# Patient Record
Sex: MALE | Race: BLACK OR AFRICAN AMERICAN | NOT HISPANIC OR LATINO | Employment: FULL TIME | URBAN - METROPOLITAN AREA
[De-identification: names, ages, dates, MRNs, and addresses within clinical notes are randomized per-mention and may not be internally consistent; named-entity substitution may affect disease eponyms.]

---

## 2024-08-18 ENCOUNTER — HOSPITAL ENCOUNTER (EMERGENCY)
Facility: HOSPITAL | Age: 29
Discharge: HOME | End: 2024-08-18
Payer: MEDICAID

## 2024-08-18 VITALS
RESPIRATION RATE: 18 BRPM | HEIGHT: 76 IN | DIASTOLIC BLOOD PRESSURE: 79 MMHG | SYSTOLIC BLOOD PRESSURE: 117 MMHG | OXYGEN SATURATION: 98 % | HEART RATE: 112 BPM | BODY MASS INDEX: 20.7 KG/M2 | TEMPERATURE: 98.1 F | WEIGHT: 170 LBS

## 2024-08-18 DIAGNOSIS — Z71.1 CONCERN ABOUT STD IN MALE WITHOUT DIAGNOSIS: Primary | ICD-10-CM

## 2024-08-18 LAB
APPEARANCE UR: ABNORMAL
BILIRUB UR STRIP.AUTO-MCNC: NEGATIVE MG/DL
CAOX CRY #/AREA UR COMP ASSIST: ABNORMAL /HPF
COLOR UR: ABNORMAL
GLUCOSE UR STRIP.AUTO-MCNC: NORMAL MG/DL
KETONES UR STRIP.AUTO-MCNC: ABNORMAL MG/DL
LEUKOCYTE ESTERASE UR QL STRIP.AUTO: ABNORMAL
MUCOUS THREADS #/AREA URNS AUTO: ABNORMAL /LPF
NITRITE UR QL STRIP.AUTO: NEGATIVE
PH UR STRIP.AUTO: 5.5 [PH]
PROT UR STRIP.AUTO-MCNC: ABNORMAL MG/DL
RBC # UR STRIP.AUTO: ABNORMAL /UL
RBC #/AREA URNS AUTO: >20 /HPF
SP GR UR STRIP.AUTO: 1.03
UROBILINOGEN UR STRIP.AUTO-MCNC: ABNORMAL MG/DL
WBC #/AREA URNS AUTO: >50 /HPF
WBC CLUMPS #/AREA URNS AUTO: ABNORMAL /HPF
YEAST BUDDING #/AREA UR COMP ASSIST: PRESENT /HPF

## 2024-08-18 PROCEDURE — 2500000004 HC RX 250 GENERAL PHARMACY W/ HCPCS (ALT 636 FOR OP/ED): Performed by: PHYSICIAN ASSISTANT

## 2024-08-18 PROCEDURE — 81001 URINALYSIS AUTO W/SCOPE: CPT | Performed by: PHYSICIAN ASSISTANT

## 2024-08-18 PROCEDURE — 87661 TRICHOMONAS VAGINALIS AMPLIF: CPT | Performed by: PHYSICIAN ASSISTANT

## 2024-08-18 PROCEDURE — 2500000001 HC RX 250 WO HCPCS SELF ADMINISTERED DRUGS (ALT 637 FOR MEDICARE OP): Performed by: PHYSICIAN ASSISTANT

## 2024-08-18 PROCEDURE — 99283 EMERGENCY DEPT VISIT LOW MDM: CPT | Performed by: PHYSICIAN ASSISTANT

## 2024-08-18 PROCEDURE — 96372 THER/PROPH/DIAG INJ SC/IM: CPT | Performed by: PHYSICIAN ASSISTANT

## 2024-08-18 PROCEDURE — 87491 CHLMYD TRACH DNA AMP PROBE: CPT | Performed by: PHYSICIAN ASSISTANT

## 2024-08-18 PROCEDURE — 99284 EMERGENCY DEPT VISIT MOD MDM: CPT | Performed by: PHYSICIAN ASSISTANT

## 2024-08-18 RX ORDER — CEFTRIAXONE 500 MG/1
500 INJECTION, POWDER, FOR SOLUTION INTRAMUSCULAR; INTRAVENOUS ONCE
Status: COMPLETED | OUTPATIENT
Start: 2024-08-18 | End: 2024-08-18

## 2024-08-18 RX ORDER — DOXYCYCLINE 100 MG/1
100 TABLET ORAL 2 TIMES DAILY
Qty: 14 TABLET | Refills: 0 | Status: SHIPPED | OUTPATIENT
Start: 2024-08-18 | End: 2024-08-25

## 2024-08-18 RX ORDER — METRONIDAZOLE 500 MG/1
2000 TABLET ORAL ONCE
Status: COMPLETED | OUTPATIENT
Start: 2024-08-18 | End: 2024-08-18

## 2024-08-18 RX ORDER — DOXYCYCLINE HYCLATE 100 MG
100 TABLET ORAL ONCE
Status: COMPLETED | OUTPATIENT
Start: 2024-08-18 | End: 2024-08-18

## 2024-08-18 ASSESSMENT — COLUMBIA-SUICIDE SEVERITY RATING SCALE - C-SSRS
1. IN THE PAST MONTH, HAVE YOU WISHED YOU WERE DEAD OR WISHED YOU COULD GO TO SLEEP AND NOT WAKE UP?: NO
6. HAVE YOU EVER DONE ANYTHING, STARTED TO DO ANYTHING, OR PREPARED TO DO ANYTHING TO END YOUR LIFE?: NO
2. HAVE YOU ACTUALLY HAD ANY THOUGHTS OF KILLING YOURSELF?: NO

## 2024-08-19 LAB
C TRACH RRNA SPEC QL NAA+PROBE: POSITIVE
N GONORRHOEA DNA SPEC QL PROBE+SIG AMP: POSITIVE
T VAGINALIS RRNA SPEC QL NAA+PROBE: NEGATIVE

## 2024-08-19 NOTE — ED PROVIDER NOTES
HPI   Chief Complaint   Patient presents with    Exposure to STD       HPI: Patient is a 29-year-old male who presents to the ED for concern of STDs.  States that he woke up today with penile discharge and burning.  States that he does have a new sexual partner.  He denies any abdominal pain, fevers, chills, flank pain or testicular pain.  ------------------------------------------------------------------------------------------------------------------------------------------  ROS: a ten point review of systems was performed and was negative except as per HPI.  ------------------------------------------------------------------------------------------------------------------------------------------  PMH / PSH: as per HPI, otherwise reviewed   MEDS: as per HPI, otherwise reviewed in EMR  ALLERGIES: as per HPI, otherwise reviewed in EMR  SocH:  as per HPI, otherwise reviewed in EMR  FH:  as per HPI, otherwise reviewed in EMR   ------------------------------------------------------------------------------------------------------------------------------------------  Physical Exam:  VS: As documented in the triage note and EMR flowsheet from this visit was reviewed  General: Well appearing. No acute distress.   Eyes:  Extraocular movements grossly intact. No scleral icterus.   Head: Atraumatic. Normocephalic.     Neck: No meningismus. No gross masses. Full movement through range of motion  CV: Regular rhythm. No murmurs, rubs, gallops appreciated.   Resp: Clear to auscultation bilaterally. No respiratory distress.    GI: Nontender. Soft. No masses. No rebound, rigidity or guarding.   MSK: Symmetric muscle bulk. No gross step offs or deformities.  Skin: Warm, dry. No rashes  Neuro: CN II-VII intact. A&O x3. Speech fluent. Alert. Moving all extremities. Ambulates with normal gait  Psych: Appropriate mood and affect for  situation  ------------------------------------------------------------------------------------------------------------------------------------------  Hospital Course / Medical Decision Making: Patient is a 29-year-old male who presents to the ED for concern of STDs.  Endorsing penile discharge and burning in the setting of new sexual partner.  On examination, patient well-appearing.  He denies any systemic symptoms.  Patient was prophylactically treated with IM Rocephin, doxycycline and metronidazole.  Prescription for doxycycline provided.  He declines blood work for HIV, hepatitis and syphilis and states that he will come back another day to have this done.As a result of the work-up, the patient was discharged home in stable condition.  They were informed of the diagnosis and instructed to come back with any concerns or worsening of condition.  Agreeable to the plan as discussed above.  Patient given the opportunity to ask questions.  All of the patient's questions were answered.               Patient History   History reviewed. No pertinent past medical history.  History reviewed. No pertinent surgical history.  No family history on file.  Social History     Tobacco Use    Smoking status: Unknown    Smokeless tobacco: Not on file   Substance Use Topics    Alcohol use: Not on file    Drug use: Not on file       Physical Exam   ED Triage Vitals [08/18/24 2044]   Temperature Heart Rate Respirations BP   36.7 °C (98.1 °F) (!) 112 18 117/79      Pulse Ox Temp Source Heart Rate Source Patient Position   98 % Temporal Monitor Sitting      BP Location FiO2 (%)     -- --       Physical Exam      ED Course & MDM   Diagnoses as of 08/18/24 2057   Concern about STD in male without diagnosis                 No data recorded     [place] Coma Scale Score: 15 (08/18/24 2045 : Noel Vasquez RN)                           Medical Decision Making      Procedure  Procedures     Regine Cummins PA-C  08/18/24 2100

## 2024-08-19 NOTE — ED TRIAGE NOTES
PT presents to ED via triage for chief complaint of STD check. PT states he woke up today having penile discharge and pain. PT denies any medical history. PT is Aox4 and ambulates on his own.

## 2024-08-21 ENCOUNTER — TELEPHONE (OUTPATIENT)
Dept: PHARMACY | Facility: HOSPITAL | Age: 29
End: 2024-08-21
Payer: MEDICAID

## 2024-08-21 NOTE — PROGRESS NOTES
EDPD Note: Antibiotics Reviewed and Warranted    Contacted Mr././Ms. Clemente Michael regarding a positive chlamydia culture/result that was taken during their recent emergency room visit. I completed education with patient . The patient is being treated appropriately with doxycycline 100 mg 1 tablet by mouth twice daily for 7 days. Spoke with patient who stated that they are feeling good. Patient was encouraged to abstain from sexual intercourse for at least 7-14 days or after completion of treatment. Patient reported he already informed his sex partner. Patient made aware that if they experience any signs/symptoms to go to ED for further evaluation. Patient verbalized understanding and had no further questions or concerns.       Reviewed Mr././Ms. Clemente Michael 's chart regarding a positive gonorrhea culture/result that was taken during their recent emergency room visit. The patient was not told about these results prior to leaving the emergency department. Therefore, patient was contacted and given proper education.      Patient presented to the ED with penile discharge and burning. Patient was given ceftriaxone while in the ED. Spoke with patient who stated that they are feeling good. Patient was encouraged to abstain from sexual intercourse for at least 7-14 days or after completion of treatment. Patient reported that he already informed his sex partner. Patient made aware that if they experience any signs/symptoms to go to ED for further evaluation.  Patient verbalized understanding and had no further questions or concerns.           No further follow up needed from EDPD Team.     DERRICK DEVINE

## 2024-12-30 ENCOUNTER — APPOINTMENT (OUTPATIENT)
Dept: RADIOLOGY | Facility: HOSPITAL | Age: 29
End: 2024-12-30
Payer: MEDICAID

## 2024-12-30 ENCOUNTER — HOSPITAL ENCOUNTER (EMERGENCY)
Facility: HOSPITAL | Age: 29
Discharge: HOME | End: 2024-12-30
Payer: MEDICAID

## 2024-12-30 VITALS
SYSTOLIC BLOOD PRESSURE: 134 MMHG | OXYGEN SATURATION: 98 % | WEIGHT: 170 LBS | DIASTOLIC BLOOD PRESSURE: 86 MMHG | BODY MASS INDEX: 21.82 KG/M2 | TEMPERATURE: 97 F | RESPIRATION RATE: 16 BRPM | HEART RATE: 71 BPM | HEIGHT: 74 IN

## 2024-12-30 DIAGNOSIS — M25.531 RIGHT WRIST PAIN: Primary | ICD-10-CM

## 2024-12-30 LAB
ALBUMIN SERPL BCP-MCNC: 3.9 G/DL (ref 3.4–5)
ALP SERPL-CCNC: 98 U/L (ref 33–120)
ALT SERPL W P-5'-P-CCNC: 22 U/L (ref 10–52)
ANION GAP SERPL CALC-SCNC: 12 MMOL/L (ref 10–20)
AST SERPL W P-5'-P-CCNC: 26 U/L (ref 9–39)
BASOPHILS # BLD AUTO: 0.02 X10*3/UL (ref 0–0.1)
BASOPHILS NFR BLD AUTO: 0.3 %
BILIRUB SERPL-MCNC: 0.5 MG/DL (ref 0–1.2)
BUN SERPL-MCNC: 9 MG/DL (ref 6–23)
CALCIUM SERPL-MCNC: 9.2 MG/DL (ref 8.6–10.6)
CHLORIDE SERPL-SCNC: 102 MMOL/L (ref 98–107)
CO2 SERPL-SCNC: 28 MMOL/L (ref 21–32)
CREAT SERPL-MCNC: 0.72 MG/DL (ref 0.5–1.3)
CRP SERPL-MCNC: 1.03 MG/DL
EGFRCR SERPLBLD CKD-EPI 2021: >90 ML/MIN/1.73M*2
EOSINOPHIL # BLD AUTO: 0.05 X10*3/UL (ref 0–0.7)
EOSINOPHIL NFR BLD AUTO: 0.7 %
ERYTHROCYTE [DISTWIDTH] IN BLOOD BY AUTOMATED COUNT: 12.7 % (ref 11.5–14.5)
ERYTHROCYTE [SEDIMENTATION RATE] IN BLOOD BY WESTERGREN METHOD: 7 MM/H (ref 0–15)
GLUCOSE SERPL-MCNC: 90 MG/DL (ref 74–99)
HCT VFR BLD AUTO: 41.2 % (ref 41–52)
HGB BLD-MCNC: 15 G/DL (ref 13.5–17.5)
IMM GRANULOCYTES # BLD AUTO: 0.02 X10*3/UL (ref 0–0.7)
IMM GRANULOCYTES NFR BLD AUTO: 0.3 % (ref 0–0.9)
LYMPHOCYTES # BLD AUTO: 2.54 X10*3/UL (ref 1.2–4.8)
LYMPHOCYTES NFR BLD AUTO: 35.8 %
MCH RBC QN AUTO: 30.7 PG (ref 26–34)
MCHC RBC AUTO-ENTMCNC: 36.4 G/DL (ref 32–36)
MCV RBC AUTO: 84 FL (ref 80–100)
MONOCYTES # BLD AUTO: 0.84 X10*3/UL (ref 0.1–1)
MONOCYTES NFR BLD AUTO: 11.8 %
NEUTROPHILS # BLD AUTO: 3.63 X10*3/UL (ref 1.2–7.7)
NEUTROPHILS NFR BLD AUTO: 51.1 %
NRBC BLD-RTO: 0 /100 WBCS (ref 0–0)
PLATELET # BLD AUTO: 162 X10*3/UL (ref 150–450)
POTASSIUM SERPL-SCNC: 3.7 MMOL/L (ref 3.5–5.3)
PROT SERPL-MCNC: 7.3 G/DL (ref 6.4–8.2)
RBC # BLD AUTO: 4.88 X10*6/UL (ref 4.5–5.9)
SODIUM SERPL-SCNC: 138 MMOL/L (ref 136–145)
WBC # BLD AUTO: 7.1 X10*3/UL (ref 4.4–11.3)

## 2024-12-30 PROCEDURE — 73130 X-RAY EXAM OF HAND: CPT | Mod: RT

## 2024-12-30 PROCEDURE — 36415 COLL VENOUS BLD VENIPUNCTURE: CPT

## 2024-12-30 PROCEDURE — 2500000004 HC RX 250 GENERAL PHARMACY W/ HCPCS (ALT 636 FOR OP/ED): Mod: SE

## 2024-12-30 PROCEDURE — 99284 EMERGENCY DEPT VISIT MOD MDM: CPT

## 2024-12-30 PROCEDURE — 73110 X-RAY EXAM OF WRIST: CPT | Mod: RIGHT SIDE | Performed by: STUDENT IN AN ORGANIZED HEALTH CARE EDUCATION/TRAINING PROGRAM

## 2024-12-30 PROCEDURE — 85025 COMPLETE CBC W/AUTO DIFF WBC: CPT

## 2024-12-30 PROCEDURE — 86140 C-REACTIVE PROTEIN: CPT

## 2024-12-30 PROCEDURE — 96372 THER/PROPH/DIAG INJ SC/IM: CPT

## 2024-12-30 PROCEDURE — 73110 X-RAY EXAM OF WRIST: CPT | Mod: RT

## 2024-12-30 PROCEDURE — 73130 X-RAY EXAM OF HAND: CPT | Mod: RIGHT SIDE | Performed by: STUDENT IN AN ORGANIZED HEALTH CARE EDUCATION/TRAINING PROGRAM

## 2024-12-30 PROCEDURE — 80053 COMPREHEN METABOLIC PANEL: CPT

## 2024-12-30 PROCEDURE — 73090 X-RAY EXAM OF FOREARM: CPT | Mod: RIGHT SIDE | Performed by: STUDENT IN AN ORGANIZED HEALTH CARE EDUCATION/TRAINING PROGRAM

## 2024-12-30 PROCEDURE — 73090 X-RAY EXAM OF FOREARM: CPT | Mod: RT

## 2024-12-30 PROCEDURE — 85652 RBC SED RATE AUTOMATED: CPT

## 2024-12-30 RX ORDER — IBUPROFEN 600 MG/1
600 TABLET ORAL EVERY 6 HOURS PRN
Qty: 30 TABLET | Refills: 0 | Status: SHIPPED | OUTPATIENT
Start: 2024-12-30 | End: 2025-01-09

## 2024-12-30 RX ORDER — KETOROLAC TROMETHAMINE 30 MG/ML
30 INJECTION, SOLUTION INTRAMUSCULAR; INTRAVENOUS ONCE
Status: COMPLETED | OUTPATIENT
Start: 2024-12-30 | End: 2024-12-30

## 2024-12-30 RX ADMIN — KETOROLAC TROMETHAMINE 30 MG: 30 INJECTION, SOLUTION INTRAMUSCULAR; INTRAVENOUS at 16:25

## 2024-12-30 ASSESSMENT — PAIN DESCRIPTION - ORIENTATION: ORIENTATION: RIGHT

## 2024-12-30 ASSESSMENT — PAIN SCALES - GENERAL: PAINLEVEL_OUTOF10: 10 - WORST POSSIBLE PAIN

## 2024-12-30 ASSESSMENT — PAIN - FUNCTIONAL ASSESSMENT: PAIN_FUNCTIONAL_ASSESSMENT: 0-10

## 2024-12-30 ASSESSMENT — PAIN DESCRIPTION - LOCATION: LOCATION: WRIST

## 2024-12-30 NOTE — ED TRIAGE NOTES
Patient states that he woke up having R wrist pain, states that his R hand is swollen. States that he believes that he slept on it wrong. No past medical history

## 2024-12-30 NOTE — ED PROVIDER NOTES
HPI   Chief Complaint   Patient presents with    Wrist Pain       Patient is an otherwise healthy 29-year-old male presenting to the ED with wrist pain.  Patient states he woke up with severe right wrist pain today.  He states he believes he slept on it funny.  He denies inciting falls, injury, or trauma.  He does endorse a history of right wrist surgery when he was a young child.  Nothing more recent than this.  Patient is right-hand dominant.  He denies take anything for symptomatic relief prior to presenting today.  He further denies any fevers, chills, flulike symptoms, or bodyaches/myalgias.              Patient History   No past medical history on file.  No past surgical history on file.  No family history on file.  Social History     Tobacco Use    Smoking status: Unknown    Smokeless tobacco: Not on file   Substance Use Topics    Alcohol use: Not on file    Drug use: Not on file       Physical Exam   ED Triage Vitals   Temperature Heart Rate Respirations BP   12/30/24 1528 12/30/24 1529 12/30/24 1528 12/30/24 1529   36.1 °C (97 °F) 71 16 134/86      Pulse Ox Temp src Heart Rate Source Patient Position   12/30/24 1528 -- -- 12/30/24 1528   98 %   Sitting      BP Location FiO2 (%)     12/30/24 1528 --     Right arm        Physical Exam  Vitals reviewed.   Constitutional:       General: He is not in acute distress.     Appearance: He is not ill-appearing.   Cardiovascular:      Rate and Rhythm: Normal rate.   Pulmonary:      Effort: Pulmonary effort is normal. No respiratory distress.      Breath sounds: Normal breath sounds.   Musculoskeletal:      Comments: Significant TTP throughout the right wrist and slightly into the hand.  No snuffbox tenderness.  Able to wiggle all phalanges, although unable to move more than this or make a fist secondary to pain.  No obvious deformity, erythema, or edema.  Sensation and vascular status intact.   Skin:     General: Skin is warm and dry.   Neurological:      General: No  focal deficit present.      Mental Status: He is alert.           ED Course & MDM   Diagnoses as of 12/30/24 1757   Right wrist pain     Labs Reviewed   CBC WITH AUTO DIFFERENTIAL - Abnormal       Result Value    WBC 7.1      nRBC 0.0      RBC 4.88      Hemoglobin 15.0      Hematocrit 41.2      MCV 84      MCH 30.7      MCHC 36.4 (*)     RDW 12.7      Platelets 162      Neutrophils % 51.1      Immature Granulocytes %, Automated 0.3      Lymphocytes % 35.8      Monocytes % 11.8      Eosinophils % 0.7      Basophils % 0.3      Neutrophils Absolute 3.63      Immature Granulocytes Absolute, Automated 0.02      Lymphocytes Absolute 2.54      Monocytes Absolute 0.84      Eosinophils Absolute 0.05      Basophils Absolute 0.02     C-REACTIVE PROTEIN - Abnormal    C-Reactive Protein 1.03 (*)    COMPREHENSIVE METABOLIC PANEL - Normal    Glucose 90      Sodium 138      Potassium 3.7      Chloride 102      Bicarbonate 28      Anion Gap 12      Urea Nitrogen 9      Creatinine 0.72      eGFR >90      Calcium 9.2      Albumin 3.9      Alkaline Phosphatase 98      Total Protein 7.3      AST 26      Bilirubin, Total 0.5      ALT 22     SEDIMENTATION RATE, AUTOMATED - Normal    Sedimentation Rate 7       XR hand right 3+ views   Final Result   No acute fracture or dislocation.             MACRO:   None        Signed by: Rodney Prince 12/30/2024 4:49 PM   Dictation workstation:   [id number]      XR wrist right 3+ views   Final Result   No acute fracture or dislocation.             MACRO:   None        Signed by: Rodney Prince 12/30/2024 4:49 PM   Dictation workstation:   [id number]      XR forearm right 2 views   Final Result   No acute fracture or dislocation.             MACRO:   None        Signed by: Rodney Prince 12/30/2024 4:49 PM   Dictation workstation:   [id number]                  No data recorded     [place] Coma Scale Score: 15 (12/30/24 1528 : Mirna Tang RN)                           Medical Decision  Making  Patient is an otherwise healthy 29-year-old male presenting to the ED with wrist pain.  History was obtained from the patient.  Woke up with severe right wrist pain today.  No inciting falls, injury, or trauma.  Has a history of right wrist surgery and he was young child.  He is right-hand dominant.  Denies other associated symptoms, as noted in HPI.  On physical exam, patient is sitting comfortably and in no apparent distress.  He admits to significant TTP throughout the right wrist and slightly into the hand.  No snuffbox tenderness.  Able to wiggle all phalanges, although unable to move more than this or make a fist secondary to pain.  No obvious deformity, erythema, or edema.  Sensation and vascular status intact.  Remainder of exam as noted above.  Patient is afebrile and vital signs are stable.    Unlikely for patient to wake up and suffer a sprain, fracture, or dislocation of the wrist.  Given his significant pain with limited ROM, will rule out septic joint at this time.  Workup obtained including CBC, CMP, ESR, CRP, and x-rays of the right hand, wrist, and forearm.  Patient treated in the ED with a dose of Toradol.  CBC with normal WBCs of 7.1.  No other gross abnormalities.  CMP normal.  ESR normal CRP flagged as elevated, although only 1.03.  X-rays are without acute abnormalities.  Patient was informed of these results.  Able to successfully rule out any kind of fracture, dislocation, or septic joint at this time.  Will treat his wrist pain as a sprain.  Patient remained stable and ready for discharge at this time.  Provided with a Velcro volar brace here in the ED.  Patient is to keep this on all the time.  He is to limit any kind of excessive ROM or heavy lifting with the right hand/arm within the coming weeks.  Recommended he take the brace off and ice the area of pain 20 minutes on followed by 20 minutes off a few times per day.  Prescription for ibuprofen sent to his pharmacy.  Provided him  with the phone number of the orthopedics clinic to facilitate outpatient follow-up if symptoms do not improve.  Patient is agreeable to this plan and all of his questions were answered to satisfaction.  He was discharged from the ED in stable condition.        Procedure  Procedures     Mayra Calle PA-C  12/30/24 3577